# Patient Record
Sex: MALE | Race: WHITE | NOT HISPANIC OR LATINO | Employment: FULL TIME | URBAN - NONMETROPOLITAN AREA
[De-identification: names, ages, dates, MRNs, and addresses within clinical notes are randomized per-mention and may not be internally consistent; named-entity substitution may affect disease eponyms.]

---

## 2018-08-16 ENCOUNTER — TELEPHONE (OUTPATIENT)
Dept: FAMILY MEDICINE | Facility: OTHER | Age: 28
End: 2018-08-16

## 2018-08-16 ENCOUNTER — HOSPITAL ENCOUNTER (EMERGENCY)
Facility: HOSPITAL | Age: 28
Discharge: HOME | End: 2018-08-16
Attending: NURSE PRACTITIONER | Admitting: NURSE PRACTITIONER
Payer: COMMERCIAL

## 2018-08-16 VITALS
SYSTOLIC BLOOD PRESSURE: 148 MMHG | TEMPERATURE: 98.8 F | RESPIRATION RATE: 16 BRPM | OXYGEN SATURATION: 98 % | DIASTOLIC BLOOD PRESSURE: 97 MMHG

## 2018-08-16 DIAGNOSIS — H10.9 BACTERIAL CONJUNCTIVITIS OF BOTH EYES: ICD-10-CM

## 2018-08-16 DIAGNOSIS — B96.89 BACTERIAL CONJUNCTIVITIS OF BOTH EYES: ICD-10-CM

## 2018-08-16 PROCEDURE — 99213 OFFICE O/P EST LOW 20 MIN: CPT | Performed by: NURSE PRACTITIONER

## 2018-08-16 PROCEDURE — G0463 HOSPITAL OUTPT CLINIC VISIT: HCPCS

## 2018-08-16 RX ORDER — FAMOTIDINE 20 MG
TABLET ORAL DAILY
COMMUNITY

## 2018-08-16 RX ORDER — POLYMYXIN B SULFATE AND TRIMETHOPRIM 1; 10000 MG/ML; [USP'U]/ML
2 SOLUTION OPHTHALMIC 4 TIMES DAILY
Qty: 3 ML | Refills: 0 | Status: SHIPPED | OUTPATIENT
Start: 2018-08-16 | End: 2019-01-31

## 2018-08-16 ASSESSMENT — ENCOUNTER SYMPTOMS
PSYCHIATRIC NEGATIVE: 1
ENDOCRINE NEGATIVE: 1
EYE DISCHARGE: 1
EYE REDNESS: 1
FEVER: 0
MUSCULOSKELETAL NEGATIVE: 1
ALLERGIC/IMMUNOLOGIC NEGATIVE: 1
NEUROLOGICAL NEGATIVE: 1
CARDIOVASCULAR NEGATIVE: 1
CHILLS: 0
EYE PAIN: 1
SORE THROAT: 1
SINUS PRESSURE: 1
COUGH: 1
GASTROINTESTINAL NEGATIVE: 1

## 2018-08-16 ASSESSMENT — VISUAL ACUITY
OD: 20/25
OS: 20/25

## 2018-08-16 NOTE — ED TRIAGE NOTES
Pt presents with a sore throat X8 days, pressure and drainage from sinuses X 8 days and eye drainage that is yellow X 2 days.

## 2018-08-16 NOTE — ED AVS SNAPSHOT
[facility]    [address] [address]    Phone:  [phone]                                       Chucky Gonzales   MRN: [id number]    Department:  [facility]   Date of Visit:  8/16/2018           After Visit Summary Signature Page     I have received my discharge instructions, and my questions have been answered. I have discussed any challenges I see with this plan with the nurse or doctor.    ..........................................................................................................................................  Patient/Patient Representative Signature      ..........................................................................................................................................  Patient Representative Print Name and Relationship to Patient    ..................................................               ................................................  Date                                            Time    ..........................................................................................................................................  Reviewed by Signature/Title    ...................................................              ..............................................  Date                                                            Time

## 2018-08-16 NOTE — DISCHARGE INSTRUCTIONS
Bacterial Conjunctivitis    You have an infection in the membranes covering the white part of the eye. This part of the eye is called the conjunctiva. The infection is called conjunctivitis. The most common symptoms of conjunctivitis include a thick, pus-like discharge from the eye, swollen eyelids, redness, eyelids sticking together upon awakening, and a gritty or scratchy feeling in the eye. Your infection was caused by bacteria. It may be treated with medicine. With treatment, the infection takes about 7 to 10 days to resolve.  Home care    Use prescribed antibiotic eye drops or ointment as directed to treat the infection.    Apply a warm compress (towel soaked in warm water) to the affected eye 3 to 4 times a day. Do this just before applying medicine to the eye.    Use a warm, wet cloth to wipe away crusting of the eyelids in the morning. This is caused by mucus drainage during the night. You may also use saline irrigating solution or artificial tears to rinse away mucus in the eye. Do not put a patch over the eye.    Wash your hands before and after touching the infected eye. This is to prevent spreading the infection to the other eye, and to other people. Don't share your towels or washcloths with others.    You may use acetaminophen or ibuprofen to control pain, unless another medicine was prescribed. (Note: If you have chronic liver or kidney disease or have ever had a stomach ulcer or gastrointestinal bleeding, talk with your doctor before using these medicines.)    Don't wear contact lenses until your eyes have healed and all symptoms are gone.  Follow-up care  Follow up with your healthcare provider, or as advised.  When to seek medical advice  Call your healthcare provider right away if any of these occur:    Worsening vision    Increasing pain in the eye    Increasing swelling or redness of the eyelid    Redness spreading around the eye  Date Last Reviewed: 7/1/2017 2000-2017 [employer],  [facility]. [address] [place] [address]. All rights reserved. This information is not intended as a substitute for professional medical care. Always follow your healthcare professional's instructions.
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
## 2018-08-16 NOTE — ED AVS SNAPSHOT
[facility]    [address]    [place] [address]    Phone:  [phone]                                       Chucky Gonzales   MRN: [id number]    Department:  [facility]   Date of Visit:  8/16/2018           Patient Information     Date Of Birth          1990        Your diagnoses for this visit were:     Bacterial conjunctivitis of both eyes        You were seen by Radha Warren APRN CNP.      Follow-up Information     Follow up with Keysha Mosley MD.    Specialty:  Family Practice    Why:  If symptoms worsen, As needed    Contact information:    [facility] [place]  [address]  [place] [address] [phone]          Follow up with [facility].    Specialty:  EMERGENCY MEDICINE    Why:  If symptoms worsen    Contact information:    [address]  [place] [address] [phone]    Additional information:    From [facility]: Take [address]. Turn left at [address]. Turn left at the first stoplight on [address]. At the first stop sign, take a right onto [address]. Take a left into the parking lot and continue through until you reach the North enterance of the building.       From [place]: Take [address]. Take the [address] ramp towards [place]. Turn left onto [address]. Take a slight right onto [address]. Turn left at the first stoplight on [address]. At the first stop sign, take a right onto [address]. Take a left into the parking lot and continue through until you reach the North enterance of the building.       From [place]: Take [address]. Take a right at [address]. At the first stop sign, take a right onto [address]. Take a left into the parking lot and continue through until you reach the North enterance of the building.         Discharge Instructions         Bacterial Conjunctivitis    You have an  infection in the membranes covering the white part of the eye. This part of the eye is called the conjunctiva. The infection is called conjunctivitis. The most common symptoms of conjunctivitis include a thick, pus-like discharge from the eye, swollen eyelids, redness, eyelids sticking together upon awakening, and a gritty or scratchy feeling in the eye. Your infection was caused by bacteria. It may be treated with medicine. With treatment, the infection takes about 7 to 10 days to resolve.  Home care    Use prescribed antibiotic eye drops or ointment as directed to treat the infection.    Apply a warm compress (towel soaked in warm water) to the affected eye 3 to 4 times a day. Do this just before applying medicine to the eye.    Use a warm, wet cloth to wipe away crusting of the eyelids in the morning. This is caused by mucus drainage during the night. You may also use saline irrigating solution or artificial tears to rinse away mucus in the eye. Do not put a patch over the eye.    Wash your hands before and after touching the infected eye. This is to prevent spreading the infection to the other eye, and to other people. Don't share your towels or washcloths with others.    You may use acetaminophen or ibuprofen to control pain, unless another medicine was prescribed. (Note: If you have chronic liver or kidney disease or have ever had a stomach ulcer or gastrointestinal bleeding, talk with your doctor before using these medicines.)    Don't wear contact lenses until your eyes have healed and all symptoms are gone.  Follow-up care  Follow up with your healthcare provider, or as advised.  When to seek medical advice  Call your healthcare provider right away if any of these occur:    Worsening vision    Increasing pain in the eye    Increasing swelling or redness of the eyelid    Redness spreading around the eye  Date Last Reviewed: 7/1/2017 2000-2017 [employer]. [address] [facility] [place]  [address]. All rights reserved. This information is not intended as a substitute for professional medical care. Always follow your healthcare professional's instructions.             Review of your medicines      START taking        Dose / Directions Last dose taken    trimethoprim-polymyxin b ophthalmic solution   Commonly known as:  POLYTRIM   Dose:  2 drop   Quantity:  3 mL        Place 2 drops into both eyes 4 times daily for 7 days   Refills:  0          Our records show that you are taking the medicines listed below. If these are incorrect, please call your family doctor or clinic.        Dose / Directions Last dose taken    cetirizine 10 MG tablet   Commonly known as:  zyrTEC   Dose:  10 mg   Quantity:  90 tablet        Take 1 tablet (10 mg) by mouth every evening   Refills:  1        tazarotene 0.1 % topical gel   Commonly known as:  TAZORAC        Apply topically At Bedtime   Refills:  0        VITAMIN C PO   Dose:  1000 mg        Take 1,000 mg by mouth daily   Refills:  0        Vitamin D (Cholecalciferol) 1000 units Caps        Take by mouth daily   Refills:  0                Prescriptions were sent or printed at these locations (1 Prescription)                   [address] [place] - [address] [facility] [address]   [address] [address] [address]    Telephone:  [phone]   Fax:  [phone]   Hours:                  E-Prescribed (1 of 1)         trimethoprim-polymyxin b (POLYTRIM) ophthalmic solution                Orders Needing Specimen Collection     None      Pending Results     No orders found from 8/14/2018 to 8/17/2018.            Pending Culture Results     No orders found from 8/14/2018 to 8/17/2018.            Thank you for choosing [facility]       Thank you for choosing [facility] for your care. Our goal is always to provide you with excellent care. Hearing back from our patients is one way we can continue to improve our services. Please take a few minutes to complete the written survey that you  "may receive in the mail after you visit with us. Thank you!        [employer] Information     [employer] lets you send messages to your doctor, view your test results, renew your prescriptions, schedule appointments and more. To sign up, go to [email or web address] . Click on \"Log in\" on the left side of the screen, which will take you to the Welcome page. Then click on \"Sign up Now\" on the right side of the page.     You will be asked to enter the access code listed below, as well as some personal information. Please follow the directions to create your username and password.     Your access code is: [email or web address]  Expires: 2018 11:02 AM     Your access code will  in 90 days. If you need help or a new code, please call your [facility] clinic or [phone].        Care EveryWhere ID     This is your Care EveryWhere ID. This could be used by other organizations to access your [facility] medical records  [id number]        Equal Access to Services     YOSSI PRETTY : Hadii darryn leyvao Sosid, waaxda luqadaha, qaybta kaalmada adeelvia, garcía chamorro . So [facility] [phone].    ATENCIÓN: Si habla español, tiene a joe disposición servicios gratuitos de asistencia lingüística. Llame al [phone].    We comply with applicable federal civil rights laws and [place] laws. We do not discriminate on the basis of race, color, national origin, age, disability, sex, sexual orientation, or gender identity.            After Visit Summary       This is your record. Keep this with you and show to your community pharmacist(s) and doctor(s) at your next visit.                  "

## 2018-08-16 NOTE — TELEPHONE ENCOUNTER
9:03 AM    Reason for Call: OVERBOOK    Patient is having the following symptoms: sinus infection / eye infection  for 3 days.    The patient is requesting an appointment for 08/16/18 with .    Was an appointment offered for this call? No  If yes : Appointment type              Date    Preferred method for responding to this message: Telephone Call  What is your phone number ?[phone]    If we cannot reach you directly, may we leave a detailed response at the number you provided? Yes    Can this message wait until your PCP/provider returns, if unavailable today? Not applicable, PCP is in    Pending sale to [facility]

## 2018-09-07 ENCOUNTER — HOSPITAL ENCOUNTER (EMERGENCY)
Facility: HOSPITAL | Age: 28
Discharge: HOME | End: 2018-09-07
Attending: NURSE PRACTITIONER | Admitting: NURSE PRACTITIONER
Payer: COMMERCIAL

## 2018-09-07 VITALS
OXYGEN SATURATION: 98 % | DIASTOLIC BLOOD PRESSURE: 83 MMHG | TEMPERATURE: 97.7 F | RESPIRATION RATE: 16 BRPM | SYSTOLIC BLOOD PRESSURE: 142 MMHG

## 2018-09-07 DIAGNOSIS — R07.0 THROAT PAIN: ICD-10-CM

## 2018-09-07 DIAGNOSIS — J01.01 ACUTE RECURRENT MAXILLARY SINUSITIS: ICD-10-CM

## 2018-09-07 DIAGNOSIS — H92.03 OTALGIA OF BOTH EARS: ICD-10-CM

## 2018-09-07 LAB
DEPRECATED S PYO AG THROAT QL EIA: NORMAL
SPECIMEN SOURCE: NORMAL

## 2018-09-07 PROCEDURE — G0463 HOSPITAL OUTPT CLINIC VISIT: HCPCS

## 2018-09-07 PROCEDURE — 99213 OFFICE O/P EST LOW 20 MIN: CPT | Performed by: NURSE PRACTITIONER

## 2018-09-07 PROCEDURE — 87081 CULTURE SCREEN ONLY: CPT | Performed by: FAMILY MEDICINE

## 2018-09-07 PROCEDURE — 87880 STREP A ASSAY W/OPTIC: CPT | Performed by: FAMILY MEDICINE

## 2018-09-07 RX ORDER — METHYLPREDNISOLONE 4 MG
TABLET, DOSE PACK ORAL
Qty: 21 TABLET | Refills: 0 | Status: SHIPPED | OUTPATIENT
Start: 2018-09-07 | End: 2019-01-02

## 2018-09-07 RX ORDER — FLUTICASONE PROPIONATE 50 MCG
2 SPRAY, SUSPENSION (ML) NASAL DAILY
Qty: 1 BOTTLE | Refills: 0 | Status: SHIPPED | OUTPATIENT
Start: 2018-09-07

## 2018-09-07 ASSESSMENT — ENCOUNTER SYMPTOMS
VOMITING: 0
FEVER: 0
WEAKNESS: 0
WHEEZING: 0
SINUS PRESSURE: 1
DIARRHEA: 0
DYSURIA: 0
RHINORRHEA: 0
VOICE CHANGE: 0
TROUBLE SWALLOWING: 0
ACTIVITY CHANGE: 0
ABDOMINAL PAIN: 0
NAUSEA: 0
PSYCHIATRIC NEGATIVE: 1
COUGH: 0
SORE THROAT: 1
SHORTNESS OF BREATH: 0
APPETITE CHANGE: 0
CHILLS: 0
STRIDOR: 0
SINUS PAIN: 1

## 2018-09-07 NOTE — DISCHARGE INSTRUCTIONS
Take antibiotics as ordered.   Eat a yogurt daily or take a probiotic daily while taking antibiotics.   Take Zyrtec 10 mg daily for 10 days. Over the counter.   Use Flonase 2 sprays to each nostril daily for 10 days.   Take Medrol dose pack as ordered. Take in the am.   Cepacol throat lozenges work well for a sore throat. Over the counter.   Gargle salt water.   Increase water intake.   Follow up with PCP with any increase in symptoms or concerns.   Return to urgent care or emergency department with any increase in symptoms or concerns.
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
## 2018-09-07 NOTE — ED AVS SNAPSHOT
[facility]    [address]    [place] [address]    Phone:  [phone]                                       Chucky Gonzales   MRN: [id number]    Department:  [facility]   Date of Visit:  9/7/2018           Patient Information     Date Of Birth          1990        Your diagnoses for this visit were:     Acute recurrent maxillary sinusitis     Throat pain     Otalgia of both ears        You were seen by Rola Owens NP.      Follow-up Information     Follow up with Keysha Mosley MD.    Specialty:  Family Practice    Why:  As needed, If symptoms worsen    Contact information:    [facility] [place]  [address]  [place] [address] [phone]          Follow up with [facility].    Specialty:  EMERGENCY MEDICINE    Why:  As needed, If symptoms worsen    Contact information:    [address]  [place] [address] [phone]    Additional information:    From [facility]: Take [address]. Turn left at [address]. Turn left at the first stoplight on [address]. At the first stop sign, take a right onto [address]. Take a left into the parking lot and continue through until you reach the North enterance of the building.       From [place]: Take [address]. Take the [address] ramp towards [place]. Turn left onto [address]. Take a slight right onto [address]. Turn left at the first stoplight on [address]. At the first stop sign, take a right onto [address]. Take a left into the parking lot and continue through until you reach the North enterance of the building.       From [place]: Take [address]. Take a right at [address]. At the first stop sign, take a right onto [address]. Take a left into the parking lot and continue through until you reach the North enterance of the building.         Discharge Instructions       Take  antibiotics as ordered.   Eat a yogurt daily or take a probiotic daily while taking antibiotics.   Take Zyrtec 10 mg daily for 10 days. Over the counter.   Use Flonase 2 sprays to each nostril daily for 10 days.   Take Medrol dose pack as ordered. Take in the am.   Cepacol throat lozenges work well for a sore throat. Over the counter.   Gargle salt water.   Increase water intake.   Follow up with PCP with any increase in symptoms or concerns.   Return to urgent care or emergency department with any increase in symptoms or concerns.     Discharge References/Attachments     EARACHE WITHOUT INFECTION (ADULT) (ENGLISH)    SINUSITIS (ANTIBIOTIC TREATMENT) (ENGLISH)    SORE THROAT, WHEN YOU HAVE A (ENGLISH)    SORE THROATS, SELF-CARE FOR (ENGLISH)         Review of your medicines      START taking        Dose / Directions Last dose taken    amoxicillin-clavulanate 875-125 MG per tablet   Commonly known as:  AUGMENTIN   Dose:  1 tablet   Quantity:  20 tablet        Take 1 tablet by mouth 2 times daily for 10 days   Refills:  0        fluticasone 50 MCG/ACT spray   Commonly known as:  FLONASE   Dose:  2 spray   Quantity:  1 Bottle        Spray 2 sprays into both nostrils daily   Refills:  0        methylPREDNISolone 4 MG tablet   Commonly known as:  MEDROL DOSEPAK   Quantity:  21 tablet        Follow package instructions   Refills:  0          Our records show that you are taking the medicines listed below. If these are incorrect, please call your family doctor or clinic.        Dose / Directions Last dose taken    cetirizine 10 MG tablet   Commonly known as:  zyrTEC   Dose:  10 mg   Quantity:  90 tablet        Take 1 tablet (10 mg) by mouth every evening   Refills:  1        tazarotene 0.1 % topical gel   Commonly known as:  TAZORAC        Apply topically At Bedtime   Refills:  0        VITAMIN C PO   Dose:  1000 mg        Take 1,000 mg by mouth daily   Refills:  0        Vitamin D (Cholecalciferol) 1000 units Caps         "Take by mouth daily   Refills:  0                Prescriptions were sent or printed at these locations (3 Prescriptions)                   [facility] [id number]   [address] [address]    Telephone:  [phone]   Fax:  [phone]   Hours:                  E-Prescribed (3 of 3)         amoxicillin-clavulanate (AUGMENTIN) 875-125 MG per tablet               fluticasone (FLONASE) 50 MCG/ACT spray               methylPREDNISolone (MEDROL DOSEPAK) 4 MG tablet                Procedures and tests performed during your visit     Beta strep group A culture    Rapid strep screen      Orders Needing Specimen Collection     None      Pending Results     Date and Time Order Name Status Description    2018 1123 Beta strep group A culture In process             Pending Culture Results     Date and Time Order Name Status Description    2018 1123 Beta strep group A culture In process             Thank you for choosing [facility]       Thank you for choosing [facility] for your care. Our goal is always to provide you with excellent care. Hearing back from our patients is one way we can continue to improve our services. Please take a few minutes to complete the written survey that you may receive in the mail after you visit with us. Thank you!        Fuel ([email or web address]) Information     Fuel ([email or web address]) lets you send messages to your doctor, view your test results, renew your prescriptions, schedule appointments and more. To sign up, go to [email or web address] . Click on \"Log in\" on the left side of the screen, which will take you to the Welcome page. Then click on \"Sign up Now\" on the right side of the page.     You will be asked to enter the access code listed below, as well as some personal information. Please follow the directions to create your username and password.     Your access code is: [email or web address]  Expires: 2018 11:02 AM     Your access code will  in 90 days. If you need help or a " new code, please call your [facility] clinic or [phone].        Care EveryWhere ID     This is your Care EveryWhere ID. This could be used by other organizations to access your [facility] medical records  [id number]        Equal Access to Services     YOSSI PRETTY : Mariaelena Morgan, waye patricioadaha, qawillta kaalaurora bowden, garcía ellis. So [facility] [phone].    ATENCIÓN: Si habla español, tiene a joe disposición servicios gratuitos de asistencia lingüística. Llame al [phone].    We comply with applicable federal civil rights laws and [place] laws. We do not discriminate on the basis of race, color, national origin, age, disability, sex, sexual orientation, or gender identity.            After Visit Summary       This is your record. Keep this with you and show to your community pharmacist(s) and doctor(s) at your next visit.

## 2018-09-09 LAB
BACTERIA SPEC CULT: NORMAL
SPECIMEN SOURCE: NORMAL

## 2019-01-02 ENCOUNTER — OFFICE VISIT (OUTPATIENT)
Dept: FAMILY MEDICINE | Facility: OTHER | Age: 29
End: 2019-01-02
Attending: FAMILY MEDICINE
Payer: COMMERCIAL

## 2019-01-02 VITALS
HEART RATE: 65 BPM | TEMPERATURE: 98.2 F | DIASTOLIC BLOOD PRESSURE: 80 MMHG | SYSTOLIC BLOOD PRESSURE: 122 MMHG | OXYGEN SATURATION: 97 % | HEIGHT: 69 IN | BODY MASS INDEX: 33.03 KG/M2 | WEIGHT: 223 LBS

## 2019-01-02 DIAGNOSIS — G89.29 CHRONIC PAIN OF LEFT KNEE: Primary | ICD-10-CM

## 2019-01-02 DIAGNOSIS — M94.0 COSTOCHONDRITIS: ICD-10-CM

## 2019-01-02 DIAGNOSIS — M25.562 CHRONIC PAIN OF LEFT KNEE: Primary | ICD-10-CM

## 2019-01-02 PROCEDURE — 99213 OFFICE O/P EST LOW 20 MIN: CPT | Performed by: FAMILY MEDICINE

## 2019-01-02 ASSESSMENT — PAIN SCALES - GENERAL: PAINLEVEL: NO PAIN (0)

## 2019-01-02 ASSESSMENT — MIFFLIN-ST. JEOR: SCORE: 1957.93

## 2019-01-02 NOTE — PROGRESS NOTES
SUBJECTIVE:   Chucky Gonzales is a 28 year old male who presents to clinic today for the following health issues:      Musculoskeletal problem/pain      Duration: couple weeks    Description  Location:  Left knee    Intensity:  moderate    Accompanying signs and symptoms: none    History  Previous similar problem: no   Previous evaluation:  none    Precipitating or alleviating factors:  Trauma or overuse: no   Aggravating factors include: kneeling on it    Therapies tried and outcome: nothing      Chest Pain      Onset: few months- 1 year    Description (location/character/radiation/duration): left chest/costochondral area    Intensity:  mild    Accompanying signs and symptoms:        Shortness of breath: no        Sweating: no        Nausea/vomitting: no        Palpitations: no        Other (fevers/chills/cough/heartburn/lightheadedness): no     History (similar episodes/previous evaluation): reports he had an EKG 11 yrs ago that he thought there was a murmur or irregular rhythm    Precipitating or alleviating factors:       Worse with exertion: no        Worse with breathing: no        Related to eating: no        Better with burping: no     Therapies tried and outcome: None      Problem list and histories reviewed & adjusted, as indicated.  Additional history: as documented    Patient Active Problem List   Diagnosis     Dysphagia     Cervicalgia     Eosinophilic esophagitis     Multiple food allergies     Costochondritis     Past Surgical History:   Procedure Laterality Date     ESOPHAGOSCOPY, GASTROSCOPY, DUODENOSCOPY (EGD), COMBINED N/A 8/4/2015    ESOPHAGOSCOPY, GASTROSCOPY, DUODENOSCOPY (EGD);  Surgeon: Fran Cain MD;  Location: [facility]       Social History     Tobacco Use     Smoking status: Never Smoker     Smokeless tobacco: Never Used   Substance Use Topics     Alcohol use: Yes     Alcohol/week: 0.0 oz     Comment: weekends      Family History   Problem Relation Age of Onset      "Unknown/Adopted Father      Heart Disease Maternal Grandmother      Heart Disease Maternal Grandfather          Current Outpatient Medications   Medication Sig Dispense Refill     Ascorbic Acid (VITAMIN C PO) Take 1,000 mg by mouth daily       cetirizine (ZYRTEC) 10 MG tablet Take 1 tablet (10 mg) by mouth every evening 90 tablet 1     fluticasone (FLONASE) 50 MCG/ACT spray Spray 2 sprays into both nostrils daily 1 Bottle 0     tazarotene (TAZORAC) 0.1 % gel Apply topically At Bedtime       Vitamin D, Cholecalciferol, 1000 units CAPS Take by mouth daily       Allergies   Allergen Reactions     Sulfa Drugs      Sulfa(sulfonamide antibiotics)     BP Readings from Last 3 Encounters:   01/02/19 122/80   09/07/18 142/83   08/16/18 148/97    Wt Readings from Last 3 Encounters:   01/02/19 101.2 kg (223 lb)   07/05/16 93 kg (205 lb)   11/25/15 94.8 kg (209 lb)                  Labs reviewed in EPIC    Reviewed and updated as needed this visit by clinical staff       Reviewed and updated as needed this visit by Provider         ROS:  Constitutional, HEENT, cardiovascular, pulmonary, gi and gu systems are negative, except as otherwise noted.    OBJECTIVE:                                                    /80   Pulse 65   Temp 98.2  F (36.8  C)   Ht 1.746 m (5' 8.75\")   Wt 101.2 kg (223 lb)   SpO2 97%   BMI 33.17 kg/m     Body mass index is 33.17 kg/m .  GENERAL APPEARANCE: healthy, alert and no distress  RESP: lungs clear to auscultation - no rales, rhonchi or wheezes  CV: regular rates and rhythm, normal S1 S2, no S3 or S4 and no murmur, click or rub  MS: extremities normal- no gross deformities noted and tender to palpation along left costochondral  ORTHO: Knee Exam: Inspection: No effusion, No quad atrophy  Tender: non-tender bilaterally  Non-tender: throughout  Active Range of Motion: full flexion, no pain with flexion, full extension, no pain with extension, all normal  Strength: full strength  Special " tests: normal Valgus stress test, normal Varus, negative Lachman's test, negative posterior drawer, negative Jose's , no apprehension with lateral stress of the patella    Also examined: hip full range of motion   PSYCH: mentation appears normal and affect normal/bright       ASSESSMENT/PLAN:                                                    1. Chronic pain of left knee  Initially by history I was concerned for meniscal injury but exam is completely normal  Will start with PT and if no improvement move to MR and/or ortho  - PHYSICAL THERAPY REFERRAL; Future    2. Costochondritis  Reviewed his old EKG and it was normal, advised monitoring his work outs, check his form, and use NSAIDs if necessary    Patient was agreeable to this plan and had no further questions.  See Patient Instructions    Keysha Mosley MD  [facility]

## 2019-01-02 NOTE — NURSING NOTE
"Chief Complaint   Patient presents with     Knee Pain       Initial /80   Pulse 65   Temp 98.2  F (36.8  C)   Ht 1.746 m (5' 8.75\")   Wt 101.2 kg (223 lb)   SpO2 97%   BMI 33.17 kg/m   Estimated body mass index is 33.17 kg/m  as calculated from the following:    Height as of this encounter: 1.746 m (5' 8.75\").    Weight as of this encounter: 101.2 kg (223 lb).  Medication Reconciliation: complete    Sachin House LPN  "

## 2019-01-31 ENCOUNTER — OFFICE VISIT (OUTPATIENT)
Dept: FAMILY MEDICINE | Facility: OTHER | Age: 29
End: 2019-01-31
Attending: FAMILY MEDICINE
Payer: COMMERCIAL

## 2019-01-31 VITALS
HEART RATE: 86 BPM | WEIGHT: 221 LBS | TEMPERATURE: 98.2 F | SYSTOLIC BLOOD PRESSURE: 120 MMHG | BODY MASS INDEX: 32.87 KG/M2 | DIASTOLIC BLOOD PRESSURE: 60 MMHG | OXYGEN SATURATION: 98 % | RESPIRATION RATE: 20 BRPM

## 2019-01-31 DIAGNOSIS — E66.09 OBESITY DUE TO EXCESS CALORIES WITHOUT SERIOUS COMORBIDITY, UNSPECIFIED CLASSIFICATION: ICD-10-CM

## 2019-01-31 DIAGNOSIS — Z20.2 STD EXPOSURE: Primary | ICD-10-CM

## 2019-01-31 DIAGNOSIS — N48.1 BALANITIS: ICD-10-CM

## 2019-01-31 LAB
C TRACH DNA SPEC QL PROBE+SIG AMP: NOT DETECTED
N GONORRHOEA DNA SPEC QL PROBE+SIG AMP: NOT DETECTED
SPECIMEN SOURCE: NORMAL

## 2019-01-31 PROCEDURE — 99000 SPECIMEN HANDLING OFFICE-LAB: CPT | Performed by: FAMILY MEDICINE

## 2019-01-31 PROCEDURE — 86695 HERPES SIMPLEX TYPE 1 TEST: CPT | Mod: 90 | Performed by: FAMILY MEDICINE

## 2019-01-31 PROCEDURE — 36415 COLL VENOUS BLD VENIPUNCTURE: CPT | Performed by: FAMILY MEDICINE

## 2019-01-31 PROCEDURE — 87491 CHLMYD TRACH DNA AMP PROBE: CPT | Performed by: FAMILY MEDICINE

## 2019-01-31 PROCEDURE — 87389 HIV-1 AG W/HIV-1&-2 AB AG IA: CPT | Mod: 90 | Performed by: FAMILY MEDICINE

## 2019-01-31 PROCEDURE — 87591 N.GONORRHOEAE DNA AMP PROB: CPT | Performed by: FAMILY MEDICINE

## 2019-01-31 PROCEDURE — 86780 TREPONEMA PALLIDUM: CPT | Mod: 90 | Performed by: FAMILY MEDICINE

## 2019-01-31 PROCEDURE — 86696 HERPES SIMPLEX TYPE 2 TEST: CPT | Mod: 90 | Performed by: FAMILY MEDICINE

## 2019-01-31 PROCEDURE — 99214 OFFICE O/P EST MOD 30 MIN: CPT | Performed by: FAMILY MEDICINE

## 2019-01-31 ASSESSMENT — PAIN SCALES - GENERAL: PAINLEVEL: MILD PAIN (2)

## 2019-01-31 NOTE — NURSING NOTE
"Chief Complaint   Patient presents with     STD       Initial /60 (BP Location: Left arm, Patient Position: Chair, Cuff Size: Adult Large)   Pulse 86   Temp 98.2  F (36.8  C) (Tympanic)   Resp 20   Wt 100.2 kg (221 lb)   SpO2 98%   BMI 32.87 kg/m   Estimated body mass index is 32.87 kg/m  as calculated from the following:    Height as of 1/2/19: 1.746 m (5' 8.75\").    Weight as of this encounter: 100.2 kg (221 lb).  Medication Reconciliation: complete    [place] LPN    "

## 2019-01-31 NOTE — PROGRESS NOTES
SUBJECTIVE:   Chucky Gonzales is a 28 year old male who presents to clinic today for the following health issues:      Genitourinary symptoms      Duration: Saturday    Description:  Burning in urine, also soreness at the tip of penis    Intensity:  NA    Accompanying signs and symptoms (fever/discharge/nausea/vomiting/back or abdominal pain):  None    History (frequent UTI's/kidney stones/prostate problems): None  Sexually active: YES just recently.    Precipitating or alleviating factors: None    Therapies tried and outcome: none   Outcome: Na      Obesity      Duration: 2 years    Description (location/character/radiation): abdomen    Intensity:  mild, moderate    Accompanying signs and symptoms: wants to lose weight for work weight loss challenge    History (similar episodes/previous evaluation): has been stressed and eating more sugar    Precipitating or alleviating factors: stress, lack of sleep    Therapies tried and outcome: None       Problem list and histories reviewed & adjusted, as indicated.  Additional history: as documented    Patient Active Problem List   Diagnosis     Dysphagia     Cervicalgia     Eosinophilic esophagitis     Multiple food allergies     Costochondritis     STD exposure     Past Surgical History:   Procedure Laterality Date     ESOPHAGOSCOPY, GASTROSCOPY, DUODENOSCOPY (EGD), COMBINED N/A 8/4/2015    ESOPHAGOSCOPY, GASTROSCOPY, DUODENOSCOPY (EGD);  Surgeon: Fran Cain MD;  Location: [facility]       Social History     Tobacco Use     Smoking status: Never Smoker     Smokeless tobacco: Never Used   Substance Use Topics     Alcohol use: Yes     Alcohol/week: 0.0 oz     Comment: weekends      Family History   Problem Relation Age of Onset     Unknown/Adopted Father      Heart Disease Maternal Grandmother      Heart Disease Maternal Grandfather          Current Outpatient Medications   Medication Sig Dispense Refill     Ascorbic Acid (VITAMIN C PO) Take 1,000 mg by mouth  daily       cetirizine (ZYRTEC) 10 MG tablet Take 1 tablet (10 mg) by mouth every evening 90 tablet 1     fluticasone (FLONASE) 50 MCG/ACT spray Spray 2 sprays into both nostrils daily 1 Bottle 0     tazarotene (TAZORAC) 0.1 % gel Apply topically At Bedtime       Vitamin D, Cholecalciferol, 1000 units CAPS Take by mouth daily       Allergies   Allergen Reactions     Sulfa Drugs      Sulfa(sulfonamide antibiotics)     BP Readings from Last 3 Encounters:   01/31/19 120/60   01/02/19 122/80   09/07/18 142/83    Wt Readings from Last 3 Encounters:   01/31/19 100.2 kg (221 lb)   01/02/19 101.2 kg (223 lb)   07/05/16 93 kg (205 lb)                  Labs reviewed in EPIC    Reviewed and updated as needed this visit by clinical staff  Tobacco  Allergies  Meds  Med Hx  Surg Hx  Fam Hx  Soc Hx      Reviewed and updated as needed this visit by Provider         ROS:  Constitutional, HEENT, cardiovascular, pulmonary, gi and gu systems are negative, except as otherwise noted.    OBJECTIVE:                                                    /60 (BP Location: Left arm, Patient Position: Chair, Cuff Size: Adult Large)   Pulse 86   Temp 98.2  F (36.8  C) (Tympanic)   Resp 20   Wt 100.2 kg (221 lb)   SpO2 98%   BMI 32.87 kg/m     Body mass index is 32.87 kg/m .  GENERAL APPEARANCE: healthy, alert and no distress  RESP: lungs clear to auscultation - no rales, rhonchi or wheezes  CV: regular rates and rhythm, normal S1 S2, no S3 or S4 and no murmur, click or rub   (male): testicles normal without atrophy or masses, no hernias, penis normal without urethral discharge and slight erythema around glans  PSYCH: mentation appears normal and affect normal/bright       ASSESSMENT/PLAN:                                                    1. STD exposure  His girlfriend reports no other partners  - GC/Chlamydia by PCR - [facility]  - Treponema Abs w Reflex to RPR and Titer  - HIV Antigen Antibody Combo  - Herpes Simplex Virus 1 and  2 IgG    2. Balanitis  Resolved -- suspect it was due to hygeine issue and then working 16 hr day -- resolved with good hygeine since    3. Obesity due to excess calories without serious comorbidity, unspecified classification  Discussed whole 30 diet    Patient was agreeable to this plan and had no further questions.  See Patient Instructions    Keysha Mosley MD  [facility]

## 2019-02-01 LAB
HIV 1+2 AB+HIV1 P24 AG SERPL QL IA: NONREACTIVE
HSV1 IGG SERPL QL IA: <0.2 AI (ref 0–0.8)
HSV2 IGG SERPL QL IA: <0.2 AI (ref 0–0.8)
T PALLIDUM AB SER QL: NONREACTIVE

## 2019-05-13 NOTE — PROGRESS NOTES
"  SUBJECTIVE:   Cuhcky Gonzales is a 28 year old male who presents to clinic today for the following   health issues:      Musculoskeletal problem/pain      Duration: couple months     Description  Location: Left knee \"popping\"    Intensity:  moderate    Accompanying signs and symptoms: burning and aching pain     History  Previous similar problem: YES  Previous evaluation:  none    Precipitating or alleviating factors:  Trauma or overuse: no   Aggravating factors include: walking     Therapies tried and outcome: exersing and strengthening helpful     STD exposure/check      Duration: nm    Description (location/character/radiation): would like std testing     Intensity:  n/a    Accompanying signs and symptoms: none    =History (similar episodes/previous evaluation): None    Precipitating or alleviating factors: None    Therapies tried and outcome: None           Additional history: as documented    Reviewed  and updated as needed this visit by clinical staff         Reviewed and updated as needed this visit by Provider         Patient Active Problem List   Diagnosis     Dysphagia     Cervicalgia     Eosinophilic esophagitis     Multiple food allergies     Costochondritis     STD exposure     Past Surgical History:   Procedure Laterality Date     ESOPHAGOSCOPY, GASTROSCOPY, DUODENOSCOPY (EGD), COMBINED N/A 8/4/2015    ESOPHAGOSCOPY, GASTROSCOPY, DUODENOSCOPY (EGD);  Surgeon: Fran Cain MD;  Location: [facility]       Social History     Tobacco Use     Smoking status: Never Smoker     Smokeless tobacco: Never Used   Substance Use Topics     Alcohol use: Yes     Alcohol/week: 0.0 oz     Comment: weekends      Family History   Problem Relation Age of Onset     Unknown/Adopted Father      Heart Disease Maternal Grandmother      Heart Disease Maternal Grandfather          Current Outpatient Medications   Medication Sig Dispense Refill     Ascorbic Acid (VITAMIN C PO) Take 1,000 mg by mouth daily       " cetirizine (ZYRTEC) 10 MG tablet Take 1 tablet (10 mg) by mouth every evening 90 tablet 1     fluticasone (FLONASE) 50 MCG/ACT spray Spray 2 sprays into both nostrils daily 1 Bottle 0     tazarotene (TAZORAC) 0.1 % gel Apply topically At Bedtime       Vitamin D, Cholecalciferol, 1000 units CAPS Take by mouth daily       Allergies   Allergen Reactions     Sulfa Drugs      Sulfa(sulfonamide antibiotics)     BP Readings from Last 3 Encounters:   05/17/19 114/83   01/31/19 120/60   01/02/19 122/80    Wt Readings from Last 3 Encounters:   05/17/19 97.5 kg (215 lb)   01/31/19 100.2 kg (221 lb)   01/02/19 101.2 kg (223 lb)                    ROS:  Constitutional, HEENT, cardiovascular, pulmonary, gi and gu systems are negative, except as otherwise noted.    OBJECTIVE:     /83 (BP Location: Left arm, Patient Position: Chair, Cuff Size: Adult Large)   Pulse 78   Temp 97.7  F (36.5  C) (Tympanic)   Wt 97.5 kg (215 lb)   SpO2 97%   BMI 31.98 kg/m    Body mass index is 31.98 kg/m .  GENERAL: healthy, alert and no distress  EYES: Eyes grossly normal to inspection, PERRL and conjunctivae and sclerae normal  NECK: no adenopathy, no asymmetry, masses, or scars and thyroid normal to palpation  RESP: Breathing comfortably  MS: No focal tenderness in the knee no effusion good range anterior medial lateral posterior stress  NEURO: Normal strength and tone, mentation intact and speech normal  PSYCH: mentation appears normal, affect normal/bright    Left knee x-ray negative.  Lab work pending    ASSESSMENT/PLAN:               ICD-10-CM    1. STD exposure Z20.2 GC/Chlamydia by PCR - [facility]     HIV Antigen Antibody Combo     Treponema Abs w Reflex to RPR and Titer     Herpes Simplex Virus 1 and 2 IgG   2. Knee locking, left M23.92 XR Knee Left 3 Views       Was tested for STDs earlier this year once a follow-up testing done labs ordered we will call him with results he has no dysuria or urethral discharge or penile sores.   Regarding the knee he states is been sore for couple weeks and is actually feeling better.  Did have some locking earlier at this point he is not interested in further investigation with MRI or seeing Ortho.  He is going to just do the exercises he was given from physical therapy and use ibuprofen.  We will call him with lab results    TOMMY Inman MD  [facility]

## 2019-05-17 ENCOUNTER — ANCILLARY PROCEDURE (OUTPATIENT)
Dept: GENERAL RADIOLOGY | Facility: OTHER | Age: 29
End: 2019-05-17
Attending: FAMILY MEDICINE
Payer: COMMERCIAL

## 2019-05-17 ENCOUNTER — OFFICE VISIT (OUTPATIENT)
Dept: FAMILY MEDICINE | Facility: OTHER | Age: 29
End: 2019-05-17
Attending: FAMILY MEDICINE
Payer: COMMERCIAL

## 2019-05-17 VITALS
HEART RATE: 78 BPM | TEMPERATURE: 97.7 F | OXYGEN SATURATION: 97 % | SYSTOLIC BLOOD PRESSURE: 114 MMHG | WEIGHT: 215 LBS | DIASTOLIC BLOOD PRESSURE: 83 MMHG | BODY MASS INDEX: 31.98 KG/M2

## 2019-05-17 DIAGNOSIS — M23.92 KNEE LOCKING, LEFT: ICD-10-CM

## 2019-05-17 DIAGNOSIS — Z20.2 STD EXPOSURE: Primary | ICD-10-CM

## 2019-05-17 PROCEDURE — 73562 X-RAY EXAM OF KNEE 3: CPT | Mod: TC

## 2019-05-17 PROCEDURE — 99213 OFFICE O/P EST LOW 20 MIN: CPT | Performed by: FAMILY MEDICINE

## 2019-05-17 PROCEDURE — 87591 N.GONORRHOEAE DNA AMP PROB: CPT | Performed by: FAMILY MEDICINE

## 2019-05-17 PROCEDURE — 99000 SPECIMEN HANDLING OFFICE-LAB: CPT | Performed by: FAMILY MEDICINE

## 2019-05-17 PROCEDURE — 86780 TREPONEMA PALLIDUM: CPT | Mod: 90 | Performed by: FAMILY MEDICINE

## 2019-05-17 PROCEDURE — 87389 HIV-1 AG W/HIV-1&-2 AB AG IA: CPT | Mod: 90 | Performed by: FAMILY MEDICINE

## 2019-05-17 PROCEDURE — 86695 HERPES SIMPLEX TYPE 1 TEST: CPT | Mod: 90 | Performed by: FAMILY MEDICINE

## 2019-05-17 PROCEDURE — 86696 HERPES SIMPLEX TYPE 2 TEST: CPT | Mod: 90 | Performed by: FAMILY MEDICINE

## 2019-05-17 PROCEDURE — 36415 COLL VENOUS BLD VENIPUNCTURE: CPT | Performed by: FAMILY MEDICINE

## 2019-05-17 PROCEDURE — 87491 CHLMYD TRACH DNA AMP PROBE: CPT | Performed by: FAMILY MEDICINE

## 2019-05-17 ASSESSMENT — PAIN SCALES - GENERAL: PAINLEVEL: MILD PAIN (2)

## 2019-05-17 NOTE — NURSING NOTE
"Chief Complaint   Patient presents with     Musculoskeletal Problem     STD       Initial /83 (BP Location: Left arm, Patient Position: Chair, Cuff Size: Adult Large)   Pulse 78   Temp 97.7  F (36.5  C) (Tympanic)   Wt 97.5 kg (215 lb)   SpO2 97%   BMI 31.98 kg/m   Estimated body mass index is 31.98 kg/m  as calculated from the following:    Height as of 1/2/19: 1.746 m (5' 8.75\").    Weight as of this encounter: 97.5 kg (215 lb).  Medication Reconciliation: complete    MAO PADRON LPN  "

## 2019-08-14 ENCOUNTER — OFFICE VISIT (OUTPATIENT)
Dept: FAMILY MEDICINE | Facility: OTHER | Age: 29
End: 2019-08-14
Attending: FAMILY MEDICINE
Payer: COMMERCIAL

## 2019-08-14 VITALS
HEART RATE: 68 BPM | DIASTOLIC BLOOD PRESSURE: 64 MMHG | HEIGHT: 70 IN | RESPIRATION RATE: 16 BRPM | BODY MASS INDEX: 29.6 KG/M2 | SYSTOLIC BLOOD PRESSURE: 110 MMHG | WEIGHT: 206.79 LBS | TEMPERATURE: 98.2 F | OXYGEN SATURATION: 98 %

## 2019-08-14 DIAGNOSIS — Z20.2 STD EXPOSURE: Primary | ICD-10-CM

## 2019-08-14 PROCEDURE — 87491 CHLMYD TRACH DNA AMP PROBE: CPT | Performed by: FAMILY MEDICINE

## 2019-08-14 PROCEDURE — 36415 COLL VENOUS BLD VENIPUNCTURE: CPT | Performed by: FAMILY MEDICINE

## 2019-08-14 PROCEDURE — 99000 SPECIMEN HANDLING OFFICE-LAB: CPT | Performed by: FAMILY MEDICINE

## 2019-08-14 PROCEDURE — 99213 OFFICE O/P EST LOW 20 MIN: CPT | Performed by: FAMILY MEDICINE

## 2019-08-14 PROCEDURE — 87389 HIV-1 AG W/HIV-1&-2 AB AG IA: CPT | Mod: 90 | Performed by: FAMILY MEDICINE

## 2019-08-14 PROCEDURE — 87591 N.GONORRHOEAE DNA AMP PROB: CPT | Performed by: FAMILY MEDICINE

## 2019-08-14 PROCEDURE — 86780 TREPONEMA PALLIDUM: CPT | Mod: 90 | Performed by: FAMILY MEDICINE

## 2019-08-14 ASSESSMENT — MIFFLIN-ST. JEOR: SCORE: 1904.25

## 2019-08-14 ASSESSMENT — PAIN SCALES - GENERAL: PAINLEVEL: NO PAIN (0)

## 2019-08-14 NOTE — PROGRESS NOTES
Subjective     Chucky Gonzales is a 28 year old male who presents to clinic today for the following health issues:    HPI   STD testing      Duration: one month with a new partner    Description (location/character/radiation): Dating a new girlfriend and would like STD testing         No symptoms of discharge, dysuria, rash. Prefers to get tested once per year.         Patient Active Problem List   Diagnosis     Dysphagia     Cervicalgia     Eosinophilic esophagitis     Multiple food allergies     Costochondritis     STD exposure     Past Surgical History:   Procedure Laterality Date     ESOPHAGOSCOPY, GASTROSCOPY, DUODENOSCOPY (EGD), COMBINED N/A 8/4/2015    ESOPHAGOSCOPY, GASTROSCOPY, DUODENOSCOPY (EGD);  Surgeon: Fran Cain MD;  Location: [facility]       Social History     Tobacco Use     Smoking status: Never Smoker     Smokeless tobacco: Never Used   Substance Use Topics     Alcohol use: Yes     Alcohol/week: 0.0 oz     Comment: weekends      Family History   Problem Relation Age of Onset     Unknown/Adopted Father      Heart Disease Maternal Grandmother      Heart Disease Maternal Grandfather          Current Outpatient Medications   Medication Sig Dispense Refill     Ascorbic Acid (VITAMIN C PO) Take 1,000 mg by mouth daily       cetirizine (ZYRTEC) 10 MG tablet Take 1 tablet (10 mg) by mouth every evening 90 tablet 1     fluticasone (FLONASE) 50 MCG/ACT spray Spray 2 sprays into both nostrils daily 1 Bottle 0     tazarotene (TAZORAC) 0.1 % gel Apply topically At Bedtime       Vitamin D, Cholecalciferol, 1000 units CAPS Take by mouth daily       Allergies   Allergen Reactions     Sulfa Drugs      Sulfa(sulfonamide antibiotics)     BP Readings from Last 3 Encounters:   08/14/19 110/64   05/17/19 114/83   01/31/19 120/60    Wt Readings from Last 3 Encounters:   08/14/19 93.8 kg (206 lb 12.7 oz)   05/17/19 97.5 kg (215 lb)   01/31/19 100.2 kg (221 lb)                Reviewed and updated as needed  "this visit by Provider  Tobacco  Allergies  Meds  Problems  Med Hx  Surg Hx  Fam Hx  Soc Hx        Review of Systems   ROS COMP: CONSTITUTIONAL: NEGATIVE for fever, chills, change in weight  ENT/MOUTH: NEGATIVE for ear, mouth and throat problems  RESP: NEGATIVE for significant cough or SOB  CV: NEGATIVE for chest pain, palpitations or peripheral edema  : negative for dysuria, rash, discharge      Objective    /64 (BP Location: Left arm, Cuff Size: Adult Regular)   Pulse 68   Temp 98.2  F (36.8  C) (Tympanic)   Resp 16   Ht 1.778 m (5' 10\")   Wt 93.8 kg (206 lb 12.7 oz)   SpO2 98%   BMI 29.67 kg/m    Body mass index is 29.67 kg/m .  Physical Exam   GENERAL: healthy, alert and no distress  NECK: no adenopathy, no asymmetry, masses, or scars and thyroid normal to palpation  RESP: lungs clear to auscultation - no rales, rhonchi or wheezes  CV: regular rate and rhythm, normal S1 S2, no S3 or S4, no murmur, click or rub, no peripheral edema and peripheral pulses strong  ABDOMEN: soft, nontender, no hepatosplenomegaly, no masses and bowel sounds normal   (male): declined  MS: no gross musculoskeletal defects noted, no edema    Results for orders placed or performed in visit on 08/14/19   GC/Chlamydia by PCR - [facility]   Result Value Ref Range    Specimen Source Urine     Neisseria gonorrhoreae PCR Not Detected NDET^Not Detected    Chlamydia Trachomatis PCR Not Detected NDET^Not Detected     HIV and syphilis pending        Assessment & Plan     (Z20.2) STD exposure  (primary encounter diagnosis)  Comment: no concerns, but prefers to be tested once per year, and today d/t new relationship. No active lesions, will not test for herpes. Lasting testing done 5/17/2019 which was negative.   Plan: HIV Antigen Antibody Combo, Treponema Abs w         Reflex to [facility] and [facility], GC/Chlamydia by PCR -         [facility], CANCELED: NEISSERIA GONORRHOEA PCR,         CANCELED: CHLAMYDIA TRACHOMATIS PCR         BMI: " "  Estimated body mass index is 29.67 kg/m  as calculated from the following:    Height as of this encounter: 1.778 m (5' 10\").    Weight as of this encounter: 93.8 kg (206 lb 12.7 oz).   Weight management plan: Discussed healthy diet and exercise guidelines   Chucky has been losing weight d/t increased exercise    See Patient Instructions    Return if symptoms worsen or fail to improve. Follow up prn    Mabel Rm MD  [facility] [place]        "

## 2019-08-14 NOTE — NURSING NOTE
"Chief Complaint   Patient presents with     STD     testing       Initial /64 (BP Location: Left arm, Cuff Size: Adult Regular)   Pulse 68   Temp 98.2  F (36.8  C) (Tympanic)   Resp 16   Ht 1.778 m (5' 10\")   Wt 93.8 kg (206 lb 12.7 oz)   SpO2 98%   BMI 29.67 kg/m   Estimated body mass index is 29.67 kg/m  as calculated from the following:    Height as of this encounter: 1.778 m (5' 10\").    Weight as of this encounter: 93.8 kg (206 lb 12.7 oz).  Medication Reconciliation: complete    "

## 2019-08-15 LAB
HIV 1+2 AB+HIV1 P24 AG SERPL QL IA: NONREACTIVE
T PALLIDUM AB SER QL: NONREACTIVE

## 2020-04-27 ENCOUNTER — TELEPHONE (OUTPATIENT)
Dept: FAMILY MEDICINE | Facility: OTHER | Age: 30
End: 2020-04-27

## 2020-04-27 NOTE — TELEPHONE ENCOUNTER
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 [facility] notified patient to check with PCP. Patient has had a rash on his left thigh which has now disappeared and rash on left shin that comes and goes (with itching when rash is present). Dogs have had s/s over the last 1-2 months, patient feels his s/s started around that same time as well.     Please advise.

## 2020-04-28 NOTE — TELEPHONE ENCOUNTER
Telephone call to patient notified the sarcoptic mange his dog was diagnosed with is not transferable to humans per Dr. Mosley. If the patient feels he needs to be seen or would like to be screened for an appointment the patient may return a call to the clinic as the patient reported the rash and itching on his shin has subsided as well. Patient expressed understanding.

## 2020-06-30 ENCOUNTER — TRANSFERRED RECORDS (OUTPATIENT)
Dept: HEALTH INFORMATION MANAGEMENT | Facility: CLINIC | Age: 30
End: 2020-06-30

## 2020-08-18 ENCOUNTER — NURSE TRIAGE (OUTPATIENT)
Dept: FAMILY MEDICINE | Facility: OTHER | Age: 30
End: 2020-08-18

## 2020-08-18 NOTE — TELEPHONE ENCOUNTER
Pt stated he has a known exposure on Friday 8/14/20. Patient reports he had dinner with a friend at a restaurant who then tested positive.  Patient stated, they were more than 6 feet apart at all times and did not share anything. Patient denies any COVID symptoms. Per our protocol pt does not meet parameters for testing. However, pt educated that if he does experience symptoms to call us back immediately. Pt also informed to call us back if his employer is requesting testing or MD. Pt verbalizes understanding. Plan is he will call us back after he speaks with his employer.     COVID 19 Nurse Triage Plan/Patient Instructions    Please be aware that novel coronavirus (COVID-19) may be circulating in the community. If you develop symptoms such as fever, cough, or SOB or if you have concerns about the presence of another infection including coronavirus (COVID-19), please contact your health care provider or visit [email or web address].     Disposition/Instructions    Home care recommended. Follow home care protocol based instructions.    Thank you for taking steps to prevent the spread of this virus.  o Limit your contact with others.  o Wear a simple mask to cover your cough.  o Wash your hands well and often.    Resources    [facility]: About COVID-19: [email or web address]    CDC: What to Do If You're Sick: www.cdc.gov/coronavirus/2019-ncov/about/steps-when-sick.html    CDC: Ending Home Isolation: www.cdc.gov/coronavirus/2019-ncov/hcp/disposition-in-home-patients.html     CDC: Caring for Someone: www.cdc.gov/coronavirus/2019-ncov/if-you-are-sick/care-for-someone.html     [facility]: Interim Guidance for Hospital Discharge to Home: www.health.[facility].[place]/diseases/coronavirus/hcp/hospdischarge.pdf    [facility] clinical trials (COVID-19 research studies): [email or web address]     Below are the COVID-19 hotlines at the [place] Department of Health ([facility]). Interpreters are  available.   o For health questions: Call 679-772-2410 or 1-258.270.8047 (7 a.m. to 7 p.m.)  o For questions about schools and childcare: Call 552-303-0588 or 1-524.306.8371 (7 a.m. to 7 p.m.)                     Reason for Disposition    [1] Caller concerned that exposure to COVID-19 occurred BUT [2] does not meet COVID-19 EXPOSURE criteria from CDC    Additional Information    Negative: COVID-19 has been diagnosed by a healthcare provider (HCP)    Negative: COVID-19 lab test positive    Negative: [1] Symptoms of COVID-19 (e.g., cough, fever, SOB, or others) AND [2] lives in an area with community spread    Negative: [1] Symptoms of COVID-19 (e.g., cough, fever, SOB, or others) AND [2] within 14 days of EXPOSURE (close contact) with diagnosed or suspected COVID-19 patient    Negative: [1] Symptoms of COVID-19 (e.g., cough, fever, SOB, or others) AND [2] within 14 days of travel from high-risk area for COVID-19 community spread (identified by CDC)    Negative: [1] Difficulty breathing (shortness of breath) occurs AND [2] onset > 14 days after COVID-19 EXPOSURE (Close Contact) AND [3] no community spread where patient lives    Negative: [1] Dry cough occurs AND [2] onset > 14 days after COVID-19 EXPOSURE AND [3] no community spread where patient lives    Negative: [1] Wet cough (i.e., white-yellow, yellow, green, or anson colored sputum) AND [2] onset > 14 days after COVID-19 EXPOSURE AND [3] no community spread where patient lives    Negative: [1] Common cold symptoms AND [2] onset > 14 days after COVID-19 EXPOSURE AND [3] no community spread where patient lives    Negative: [1] COVID-19 EXPOSURE (Close Contact) within last 14 days AND [2] needs COVID-19 lab test to return to work AND [3] NO symptoms    Negative: [1] COVID-19 EXPOSURE (Close Contact) within last 14 days AND [2] exposed person is a healthcare worker who was NOT using all recommended personal protective equipment (i.e., a respirator-N95 mask, eye  "protection, gloves, and gown) AND [3] NO symptoms    Negative: [1] COVID-19 EXPOSURE (Close Contact) AND [2] within last 14 days BUT [3] NO symptoms    Negative: [1] COVID-19 EXPOSURE AND [2] 15 or more days ago AND [3] NO symptoms    Negative: [1] Living in area with community spread (identified by local PHD) BUT [2] NO symptoms    Negative: [1] Travel from area with community spread (identified by CDC) AND [2] within last 14 days BUT [3] NO symptoms    Negative: [1] No COVID-19 EXPOSURE BUT [2] living with someone who was exposed and who has no symptoms of COVID-19    Answer Assessment - Initial Assessment Questions  1. CLOSE CONTACT: \"Who is the person with the confirmed or suspected COVID-19 infection that you were exposed to?\"      friend  2. PLACE of CONTACT: \"Where were you when you were exposed to COVID-19?\" (e.g., home, school, medical waiting room; which city?)  Restaurant   3. TYPE of CONTACT: \"How much contact was there?\" (e.g., sitting next to, live in same house, work in same office, same building)      Spaced more than 6 ft table  4. DURATION of CONTACT: \"How long were you in contact with the COVID-19 patient?\" (e.g., a few seconds, passed by person, a few minutes, live with the patient)      Couple hours  5. DATE of CONTACT: \"When did you have contact with a COVID-19 patient?\" (e.g., how many days ago)      8/14/20  6. TRAVEL: \"Have you traveled out of the country recently?\" If so, \"When and where?\"      * Also ask about out-of-state travel, since the CDC has identified some high-risk cities for community spread in the [place].      * Note: Travel becomes less relevant if there is widespread community transmission where the patient lives.      no  7. COMMUNITY SPREAD: \"Are there lots of cases of COVID-19 (community spread) where you live?\" (See public health department website, if unsure)        minor  8. SYMPTOMS: \"Do you have any symptoms?\" (e.g., fever, cough, breathing difficulty)      no  9. " "PREGNANCY OR POSTPARTUM: \"Is there any chance you are pregnant?\" \"When was your last menstrual period?\" \"Did you deliver in the last 2 weeks?\"      n/a  10. HIGH RISK: \"Do you have any heart or lung problems? Do you have a weak immune system?\" (e.g., CHF, COPD, asthma, HIV positive, chemotherapy, renal failure, diabetes mellitus, sickle cell anemia)        Denies all    Protocols used: CORONAVIRUS (COVID-19) EXPOSURE-A- 5.16.20      "

## 2021-04-18 ENCOUNTER — HEALTH MAINTENANCE LETTER (OUTPATIENT)
Age: 31
End: 2021-04-18

## 2021-10-03 ENCOUNTER — HEALTH MAINTENANCE LETTER (OUTPATIENT)
Age: 31
End: 2021-10-03

## 2022-05-14 ENCOUNTER — HEALTH MAINTENANCE LETTER (OUTPATIENT)
Age: 32
End: 2022-05-14

## 2022-09-04 ENCOUNTER — HEALTH MAINTENANCE LETTER (OUTPATIENT)
Age: 32
End: 2022-09-04

## 2023-06-03 ENCOUNTER — HEALTH MAINTENANCE LETTER (OUTPATIENT)
Age: 33
End: 2023-06-03